# Patient Record
Sex: MALE | Race: WHITE
[De-identification: names, ages, dates, MRNs, and addresses within clinical notes are randomized per-mention and may not be internally consistent; named-entity substitution may affect disease eponyms.]

---

## 2021-05-20 ENCOUNTER — HOSPITAL ENCOUNTER (INPATIENT)
Dept: HOSPITAL 35 - PRE | Age: 71
LOS: 6 days | Discharge: HOME HEALTH | DRG: 470 | End: 2021-05-26
Attending: ORTHOPAEDIC SURGERY | Admitting: ORTHOPAEDIC SURGERY
Payer: COMMERCIAL

## 2021-05-20 ENCOUNTER — HOSPITAL ENCOUNTER (OUTPATIENT)
Dept: HOSPITAL 35 - LAB | Age: 71
End: 2021-05-20
Payer: COMMERCIAL

## 2021-05-20 VITALS — BODY MASS INDEX: 25.47 KG/M2 | WEIGHT: 188 LBS | HEIGHT: 72.01 IN

## 2021-05-20 DIAGNOSIS — I10: ICD-10-CM

## 2021-05-20 DIAGNOSIS — M16.12: Primary | ICD-10-CM

## 2021-05-20 DIAGNOSIS — Z01.812: Primary | ICD-10-CM

## 2021-05-20 DIAGNOSIS — Z20.822: ICD-10-CM

## 2021-05-20 DIAGNOSIS — I48.20: ICD-10-CM

## 2021-05-20 LAB
ALBUMIN SERPL-MCNC: 4.3 G/DL (ref 3.4–5)
ANION GAP SERPL CALC-SCNC: 10 MMOL/L (ref 7–16)
BILIRUB UR-MCNC: NEGATIVE MG/DL
BUN SERPL-MCNC: 14 MG/DL (ref 7–18)
CALCIUM SERPL-MCNC: 9.5 MG/DL (ref 8.5–10.1)
CHLORIDE SERPL-SCNC: 103 MMOL/L (ref 98–107)
CO2 SERPL-SCNC: 27 MMOL/L (ref 21–32)
COLOR UR: YELLOW
CREAT SERPL-MCNC: 1.1 MG/DL (ref 0.7–1.3)
ERYTHROCYTE [DISTWIDTH] IN BLOOD BY AUTOMATED COUNT: 14.3 % (ref 10.5–14.5)
GLUCOSE SERPL-MCNC: 89 MG/DL (ref 74–106)
HCT VFR BLD CALC: 46.9 % (ref 42–52)
HGB BLD-MCNC: 15.8 GM/DL (ref 14–18)
INR PPP: 1.04
KETONES UR STRIP-MCNC: NEGATIVE MG/DL
MCH RBC QN AUTO: 32.6 PG (ref 26–34)
MCHC RBC AUTO-ENTMCNC: 33.7 G/DL (ref 28–37)
MCV RBC: 96.6 FL (ref 80–100)
PLATELET # BLD: 220 THOU/UL (ref 150–400)
POTASSIUM SERPL-SCNC: 4.2 MMOL/L (ref 3.5–5.1)
PROTHROMBIN TIME: 11.3 SECONDS (ref 10.5–12.1)
RBC # BLD AUTO: 4.85 MIL/UL (ref 4.5–6)
RBC # UR STRIP: NEGATIVE /UL
SODIUM SERPL-SCNC: 140 MMOL/L (ref 136–145)
SP GR UR STRIP: 1.01 (ref 1–1.03)
URINE CLARITY: CLEAR
URINE GLUCOSE-RANDOM*: NEGATIVE
URINE LEUKOCYTES-REFLEX: NEGATIVE
URINE NITRITE-REFLEX: NEGATIVE
URINE PROTEIN (DIPSTICK): NEGATIVE
UROBILINOGEN UR STRIP-ACNC: 0.2 E.U./DL (ref 0.2–1)
WBC # BLD AUTO: 6.2 THOU/UL (ref 4–11)

## 2021-05-20 PROCEDURE — 50010 RENAL EXPLORATION: CPT

## 2021-05-20 PROCEDURE — 70005: CPT

## 2021-05-20 PROCEDURE — 50382 CHANGE URETER STENT PERCUT: CPT

## 2021-05-20 PROCEDURE — 62900: CPT

## 2021-05-20 PROCEDURE — 10102: CPT

## 2021-05-20 PROCEDURE — 51412: CPT

## 2021-05-20 PROCEDURE — 50101: CPT

## 2021-05-20 PROCEDURE — 50414: CPT

## 2021-05-20 PROCEDURE — 62110: CPT

## 2021-05-20 PROCEDURE — 53000 INCISION OF URETHRA: CPT

## 2021-05-24 VITALS — DIASTOLIC BLOOD PRESSURE: 82 MMHG | SYSTOLIC BLOOD PRESSURE: 134 MMHG

## 2021-05-24 VITALS — SYSTOLIC BLOOD PRESSURE: 128 MMHG | DIASTOLIC BLOOD PRESSURE: 81 MMHG

## 2021-05-24 VITALS — SYSTOLIC BLOOD PRESSURE: 131 MMHG | DIASTOLIC BLOOD PRESSURE: 85 MMHG

## 2021-05-24 VITALS — DIASTOLIC BLOOD PRESSURE: 88 MMHG | SYSTOLIC BLOOD PRESSURE: 152 MMHG

## 2021-05-24 VITALS — SYSTOLIC BLOOD PRESSURE: 129 MMHG | DIASTOLIC BLOOD PRESSURE: 80 MMHG

## 2021-05-24 PROCEDURE — 0SRB01A REPLACEMENT OF LEFT HIP JOINT WITH METAL SYNTHETIC SUBSTITUTE, UNCEMENTED, OPEN APPROACH: ICD-10-PCS | Performed by: ORTHOPAEDIC SURGERY

## 2021-05-24 NOTE — NUR
cm spk with pt re d/c planning. pt indicated he lives home with family. he is
independent w/cares, drives a vehicle. pt has 1 step to enter home. and 8
stairs inside, 4 steps-platform-4 steps. pt denies hx with home health or snf,
but pt is open to hh at [place]. cm to cont to follow.

## 2021-05-24 NOTE — NUR
Pt arrived to floor from recovery room around 1100 in stable conditon.
Admission hx,assessment and care plan completed.Hospitalist paged for consult
but no response.House supervisor notified and she sent message to Dr Murphy.
Pt tolerated meds and diet.Family here to visit,updates given.No verbal c/o.
Pt up to bathroom with walker. Report off to noc rn.

## 2021-05-25 VITALS — DIASTOLIC BLOOD PRESSURE: 58 MMHG | SYSTOLIC BLOOD PRESSURE: 103 MMHG

## 2021-05-25 VITALS — SYSTOLIC BLOOD PRESSURE: 103 MMHG | DIASTOLIC BLOOD PRESSURE: 58 MMHG

## 2021-05-25 VITALS — SYSTOLIC BLOOD PRESSURE: 110 MMHG | DIASTOLIC BLOOD PRESSURE: 73 MMHG

## 2021-05-25 VITALS — SYSTOLIC BLOOD PRESSURE: 157 MMHG | DIASTOLIC BLOOD PRESSURE: 90 MMHG

## 2021-05-25 VITALS — SYSTOLIC BLOOD PRESSURE: 131 MMHG | DIASTOLIC BLOOD PRESSURE: 83 MMHG

## 2021-05-25 LAB
ANION GAP SERPL CALC-SCNC: 5 MMOL/L (ref 7–16)
BUN SERPL-MCNC: 16 MG/DL (ref 7–18)
CALCIUM SERPL-MCNC: 9 MG/DL (ref 8.5–10.1)
CHLORIDE SERPL-SCNC: 107 MMOL/L (ref 98–107)
CO2 SERPL-SCNC: 29 MMOL/L (ref 21–32)
CREAT SERPL-MCNC: 0.9 MG/DL (ref 0.7–1.3)
ERYTHROCYTE [DISTWIDTH] IN BLOOD BY AUTOMATED COUNT: 14.5 % (ref 10.5–14.5)
GLUCOSE SERPL-MCNC: 140 MG/DL (ref 74–106)
HCT VFR BLD CALC: 39.2 % (ref 42–52)
HGB BLD-MCNC: 13 GM/DL (ref 14–18)
MAGNESIUM SERPL-MCNC: 2.1 MG/DL (ref 1.8–2.4)
MCH RBC QN AUTO: 32.2 PG (ref 26–34)
MCHC RBC AUTO-ENTMCNC: 33.1 G/DL (ref 28–37)
MCV RBC: 97.3 FL (ref 80–100)
PLATELET # BLD: 191 THOU/UL (ref 150–400)
POTASSIUM SERPL-SCNC: 4.9 MMOL/L (ref 3.5–5.1)
RBC # BLD AUTO: 4.03 MIL/UL (ref 4.5–6)
SODIUM SERPL-SCNC: 141 MMOL/L (ref 136–145)
WBC # BLD AUTO: 12.5 THOU/UL (ref 4–11)

## 2021-05-25 NOTE — O
[facility]
[address] [place]   [address]                     OPERATIVE REPORT              
_______________________________________________________________________________
 
Name:       FOREIGN MAYO               Room #:         439-P       ADM IN  
M.R.#:      [id number]                       Account #:      [id number]  
Admission:  05/24/21    Attend Phys:    Abel Black MD   
Discharge:              Date of Birth:  [birth year]  
                                                          Report #: [id number]
                                                                    [id number] 
_______________________________________________________________________________
THIS REPORT FOR:  
 
cc:  Clement Tatum MD, Ralph R. MD Clymer, David J. MD                                           ~
 
DOC #: [id number]
Abel Black MD
DATE OF SERVICE: 05/24/2021
 
PREOPERATIVE DIAGNOSIS:  End-stage degenerative arthritis, left hip.
 
POSTOPERATIVE DIAGNOSIS:  End-stage degenerative arthritis, left hip.
 
PROCEDURE:  Left total hip arthroplasty.
 
SURGEON:  Abel Black MD.
 
INDICATIONS:  This slender, fit and active 71-year-old gentleman complains of 
severe left hip pain and limited range of motion.  Clinical exam and x-rays 
confirm rather severe end-stage degenerative arthritis with loss of joint space 
and significant spurring.  He has tried conservative measures without benefit.  
He has decided to go ahead with total hip replacement.  He has been evaluated by
his primary care physician and his cardiologist and noted to have stable atrial 
fibrillation with a controlled rate.  They feel he is safe for elective 
anesthesia and surgery.
 
DESCRIPTION OF PROCEDURE:  The patient was taken to the operating room, was 
placed under general anesthesia.  Prophylactic intravenous antibiotics were 
administered.  He was turned to the right lateral decubitus position.  The left 
hip, thigh and leg were meticulously prepped and draped.  A slightly curving 
posterolateral skin incision was made.  This was carried through the gluteus, 
exposing the posterior aspect of the hip joint.  The short external rotators and
capsule were taken down and preserved and then tagged with several #2 FiberWire 
sutures.  The hip was dislocated posteriorly with some difficulty as there was 
marked spurring around the acetabulum and femoral neck.  The spurs were debrided
and good visualization was established.  Femoral neck osteotomy was performed.  
The canal was prepared using reamers and hand broaches.  The Smith and Nephew 
hip system was utilized.  The femur seemed best suited for a size 14 standard 
femoral stem.  The calcar was trimmed down to an appropriate level.  The trial 
component was removed and attention directed to the acetabulum.  Adequate 
exposure was established and the acetabulum was sequentially reamed, gradually 
advancing to a 54 mm reamer.  A King and Nephew 54 mm 3-hole StikTite 
acetabular shell was selected.  This was impacted into the acetabulum in 
alignment with his true acetabulum, which placed this at about 45 degrees off of
vertical and 20-25 degrees of anteversion.  It seated nicely and appeared to be 
 
 
 
[address]   [address]                     OPERATIVE REPORT              
_______________________________________________________________________________
 
Name:       FOREIGN MAYO               Room #:         439-P       [facility] [place]  
M.R.#:      [id number]                       Account #:      [id number]  
Admission:  05/24/21    Attend Phys:    Abel Black MD   
Discharge:              Date of Birth:  [birth year]  
                                                          Report #: [id number]
                                                                    [id number] 
_______________________________________________________________________________
 
secure.  In addition, three cancellous screws were placed through the apical 
holes, engaging good periacetabular bone adding to stability.  A 36 x 54 mm 
polyethylene liner was then selected and impacted into the cup and seated nicely
and appeared to be secure.  The 20-degree elevated rim was placed at about the 
10 o'clock posterior position.  Attention was then directed back to the femoral 
side.  A size 14 Synergy porous femoral component was impacted into position.  
It seated nicely in about 15 degrees of anteversion.  Trial reduction was 
performed and the hip seemed best suited for a +4 mm neck length.  This resulted
in full hip extension, although he is slightly tight in hyperextension and 
rotation.  The hip seemed stable.  The trial component was removed and the 
permanent Smith and Nephew size 14 Synergy porous stem was inserted.  The 4 mm x
36 mm cobalt chrome head was impacted on the Benedict taper.  The hip was reduced. 
Alignment, range of motion, stability and leg length were assessed and felt to 
be satisfactory.  The wound was copiously irrigated.  Good hemostasis was 
established.  The capsule and short external rotators were repaired back to bone
using FiberWire sutures previously placed.  This resulted in satisfactory, 
additional hip stability.  A single Hemovac was left in the wound exiting 
through a separate stab incision.  The fascia was closed with multiple #1 Vicryl
sutures.  The adipose tissues and subcutaneous tissues were closed with 0 
Monocryl.  The skin was closed with skin staples.  A sterile dressing was 
applied.  The patient was awakened and returned to recovery room in good 
condition.
 
MD TODD Walters/IMAN
 
D: 05/24/2021 09:39 AM
T: 05/24/2021 09:59 AM
 
 
 
 
 
 
 
 
 
 
 
 
 
 
 
  <ELECTRONICALLY SIGNED>
   By: Abel Black MD           
  05/25/21     0805
D: 05/24/21 0839                           _____________________________________
T: 05/24/21 0859                           Abel Black MD             /nt

## 2021-05-25 NOTE — NUR
on-going assessment: CM REVIEWED CHART AND SPOKE WITH PATIENT AT THE BEDSIDE.
PT REPORTS THAT HE DOES NOT HAVE ANY DME AT HIS HOME. CM STATED WE WILL SEE
HOW PATIENT DOES WITH THERAPY AND CM CAN HELP ASSIST WITH AN EQUIPMENT HE MAY
NEED. PT STATING HE IS WANTING HOME HEALTH CARE AT DISCHARGE. PT REPORTS HE
HAS NO PREFERENCE OF HH AGENCY. CM FAXED REFERRAL TO [facility]. PT
IS TO CONTINUE TO WORK WITH THERAPY. CM WILL CONTINUE TO FOLLOW TO ASSIST AS
NEEDED.

## 2021-05-25 NOTE — NUR
ASSESSED AT START OF SHIFT, PT A&OX4. RATES PAIN A 5/10 HYDROCODONE GIVEN FOR
PAIN. PT UP WITH ASSISTX1 WITH A WALKER AND GB TO THE BATHROOM. PT WAS ABLE TO
AMBULATE THE UNIT THIS EVENING. IV INTACT AND FLUIDS INFUSING. DENIES N/V.
JUAN DRESSING INTACT AND ICE BAG PROVIDED FOR COMFORT. SCD'S, TEDHOSE IN
PLACE. WILL CONT WITH POC TILL EOS.

## 2021-05-25 NOTE — NUR
ASSUMED PT CARE THIS AM. PATIENT A&OX4, ABLE TO MAKE NEEDS KNOWN. PATIENT HAS
A JUAN DRESSING TO LEFT HIP. REPORTED PAIN THIS AM, RESPONDED WELL TO PAIN
MEDS GIVEN PER EMAR. IV PATENT, SALINE LOCKED. PATIENT USING AN ICE BAG ON HIS
HIP AS NEEDED. PATIENT REMAINS CONTINENT, USING A URINAL AND AMBULATORY TO THE
BATHROOM AS NEEDED. PATIENT TOLERATING DIET WELL. PATIENT IS ON ROOM AIR.
DENIES ANY NUMBNESS, TINGLING, OR NAUSEA. FALL PRECAUTIONS ARE IN PLACE, CALL
LIGHT WITHIN REACH.

## 2021-05-26 VITALS — DIASTOLIC BLOOD PRESSURE: 95 MMHG | SYSTOLIC BLOOD PRESSURE: 142 MMHG

## 2021-05-26 VITALS — SYSTOLIC BLOOD PRESSURE: 142 MMHG | DIASTOLIC BLOOD PRESSURE: 95 MMHG

## 2021-05-26 VITALS — DIASTOLIC BLOOD PRESSURE: 84 MMHG | SYSTOLIC BLOOD PRESSURE: 130 MMHG

## 2021-05-26 LAB
ANION GAP SERPL CALC-SCNC: 9 MMOL/L (ref 7–16)
BASOPHILS NFR BLD AUTO: 0.4 % (ref 0–2)
BUN SERPL-MCNC: 17 MG/DL (ref 7–18)
CALCIUM SERPL-MCNC: 8.7 MG/DL (ref 8.5–10.1)
CHLORIDE SERPL-SCNC: 103 MMOL/L (ref 98–107)
CO2 SERPL-SCNC: 28 MMOL/L (ref 21–32)
CREAT SERPL-MCNC: 0.9 MG/DL (ref 0.7–1.3)
EOSINOPHIL NFR BLD: 0.2 % (ref 0–3)
ERYTHROCYTE [DISTWIDTH] IN BLOOD BY AUTOMATED COUNT: 14.4 % (ref 10.5–14.5)
GLUCOSE SERPL-MCNC: 92 MG/DL (ref 74–106)
GRANULOCYTES NFR BLD MANUAL: 66.1 % (ref 36–66)
HCT VFR BLD CALC: 38.2 % (ref 42–52)
HGB BLD-MCNC: 12.9 GM/DL (ref 14–18)
LYMPHOCYTES NFR BLD AUTO: 19.8 % (ref 24–44)
MAGNESIUM SERPL-MCNC: 2 MG/DL (ref 1.8–2.4)
MCH RBC QN AUTO: 32.7 PG (ref 26–34)
MCHC RBC AUTO-ENTMCNC: 33.6 G/DL (ref 28–37)
MCV RBC: 97.2 FL (ref 80–100)
MONOCYTES NFR BLD: 13.5 % (ref 1–8)
NEUTROPHILS # BLD: 7 THOU/UL (ref 1.4–8.2)
PLATELET # BLD: 163 THOU/UL (ref 150–400)
POTASSIUM SERPL-SCNC: 4 MMOL/L (ref 3.5–5.1)
RBC # BLD AUTO: 3.94 MIL/UL (ref 4.5–6)
SODIUM SERPL-SCNC: 140 MMOL/L (ref 136–145)
WBC # BLD AUTO: 10.6 THOU/UL (ref 4–11)

## 2021-05-26 NOTE — NUR
ASSESSED AT STRAT OF SHIFT. PT A&OX4. RATES PAIN 7/10. MEDICATION GIVEN. SEE
EMAR. PT UP WITH SBA TO THE BATHROOM WALKS WELL. JUAN DRESSING, SCD'S AND
TEDHOSE IN PLACE. CALL LIGHT AT REACH AND PT ANTICIPATING DC TOMORROW. WILL
CONT TO MONITOR.

## 2021-05-26 NOTE — NUR
Assumed care of pt at 0700. Pt a&ox4. SBA with gaitbelt and walker. Pain
controlled with prn pain meds. Possible d/c today to home. Waiting on walker
to be delivered. Call light within reach. Will continue to monitor.

## 2021-05-26 NOTE — NUR
on-going assessment: CM REVIEWED CHART AND SPOKE WITH PATIENT. [facility]  HAS
ACCEPTED PATIENT AND WAS NOTIFIED OF DISCHARGE TODAY. CM FAXED DISCHARGE
ORDERS AND CONFIRMED THEY RECEIVED IT. PT WAS ALSO NEEDING A WALKER FOR
DISCHARGE AND HAS NO PREFERENCE OF DME COMPANY. [facility] WAS NOTIFIED AND
DELIVERED A WALKER TO PATIENTS ROOM. PT WAS CLEARED TO DISCHARGE HOME TODAY BY
THERAPY.

## 2021-05-27 NOTE — D
[facility]
[address]
[place]   [address]                     DISCHARGE SUMMARY             
_______________________________________________________________________________
 
Name:       FOREIGN MAYO               Room #:         439-P       [facility] [place]  
M.R.#:      [id number]                       Account #:      [id number]  
Admission:  05/24/21    Attend Phys:    Abel Black MD   
Discharge:  05/26/21    Date of Birth:  [birth year]  
                                                          Report #: [id number]
                                                                    [id number] 
_______________________________________________________________________________
THIS REPORT FOR:  
 
cc:  Clement Tatum MD, Ralph R. MD Clymer,Abel NGUYEN MD                                           ~
DOC #: [id number]
Abel Black MD
DATE OF SERVICE: 05/26/2021
 
DATE OF ADMISSION:  05/24/2021
 
DATE OF DISCHARGE:  05/26/2021
 
FINAL DIAGNOSES:
1.  End-stage degenerative arthritis, left hip.
2.  Hypertension.
3.  Well controlled atrial fibrillation on chronic anticoagulation.
 
OPERATIONS AND PROCEDURES:  Left total hip replacement.
 
HISTORY:  This 71-year-old gentleman is slender, fit and active.  He is 
generally healthy, but has recently been diagnosed with stable and well 
controlled atrial fibrillation.  He has mild hypertension, but no other 
significant medical problems.  His left hip pain has been severe and progressive
with findings consistent with severe end-stage degenerative arthritis.  He has 
elected to go ahead with left total hip replacement.
 
HOSPITAL COURSE:  The patient was admitted and taken to the operating room on 
05/24.  He underwent left total hip replacement, which he tolerated quite 
nicely.  Postoperatively, his course has been largely unremarkable.  He is 
advanced to a regular diet and is doing well on oral pain medication.  His 
hypertension is well controlled.  His preexisting chronic atrial fibrillation is
also well controlled with a satisfactory rate and no cardiac symptoms.  The hip 
x-rays look good.  The wound and dressing look fine.  He has made good progress 
with physical therapy and has advanced to independent ambulation and stairs 
using a walker for protection.  The patient and wife are anxious for hospital 
discharge today.  We will plan for some visiting therapy at home.
 
DISCHARGE MEDICATIONS:  Include amlodipine 5/40 one tablet daily, Eliquis 5 mg 
b.i.d., metoprolol 25 mg b.i.d., multivitamin once every day, hydrocodone 5 mg 
q.4 hours p.r.n. for pain.  He will continue with a regular diet and continue 
with moderate activity with visiting therapy at home for assistance.  I will 
plan to see him back in my office in one week for followup.
 
Abel Black MD
[facility]/[address]   [address]                     DISCHARGE SUMMARY             
_______________________________________________________________________________
 
Name:       FOREIGN MAYO               Room #:         439-P       [facility] [place]  
M.R.#:      [id number]                       Account #:      [id number]  
Admission:  05/24/21    Attend Phys:    Abel Black MD   
Discharge:  05/26/21    Date of Birth:  [birth year]  
                                                          Report #: [id number]
                                                                    [id number] 
_______________________________________________________________________________
 
D: 05/26/2021 01:01 PM
T: 05/26/2021 01:32 PM
 
 
 
 
 
 
 
 
 
 
 
 
 
 
 
 
 
 
 
 
 
 
 
 
 
 
 
 
 
 
 
 
 
 
 
 
 
 
 
 
 
  <ELECTRONICALLY SIGNED>
   By: Abel Black MD           
  05/27/21     0756
D: 05/26/21 1201                           _____________________________________
T: 05/26/21 1232                           Abel Black MD             /nt